# Patient Record
Sex: FEMALE | Race: WHITE | NOT HISPANIC OR LATINO | Employment: FULL TIME | URBAN - METROPOLITAN AREA
[De-identification: names, ages, dates, MRNs, and addresses within clinical notes are randomized per-mention and may not be internally consistent; named-entity substitution may affect disease eponyms.]

---

## 2017-02-14 PROBLEM — E66.813 OBESITY, CLASS III, BMI 40-49.9 (MORBID OBESITY): Status: ACTIVE | Noted: 2017-02-14

## 2017-02-14 PROBLEM — E66.01 OBESITY, CLASS III, BMI 40-49.9 (MORBID OBESITY): Status: ACTIVE | Noted: 2017-02-14

## 2017-02-14 PROBLEM — F32.9 MAJOR DEPRESSIVE DISORDER WITH CURRENT ACTIVE EPISODE: Status: ACTIVE | Noted: 2017-02-14

## 2017-04-12 PROBLEM — F32.5 MAJOR DEPRESSIVE DISORDER IN REMISSION: Status: ACTIVE | Noted: 2017-02-14

## 2017-04-12 PROBLEM — K76.0 FATTY LIVER: Status: ACTIVE | Noted: 2017-04-12

## 2017-04-12 PROBLEM — K22.4 ESOPHAGEAL SPASM: Status: ACTIVE | Noted: 2017-04-12

## 2017-04-12 PROBLEM — I10 ESSENTIAL HYPERTENSION: Status: ACTIVE | Noted: 2017-04-12

## 2017-04-12 PROBLEM — K82.4 GALLBLADDER POLYP: Status: ACTIVE | Noted: 2017-04-12

## 2017-04-12 PROBLEM — K21.9 GERD (GASTROESOPHAGEAL REFLUX DISEASE): Status: ACTIVE | Noted: 2017-04-12

## 2019-10-04 PROBLEM — Z12.11 COLON CANCER SCREENING: Status: ACTIVE | Noted: 2019-10-04

## 2021-03-18 ENCOUNTER — IMMUNIZATION (OUTPATIENT)
Dept: FAMILY MEDICINE | Facility: CLINIC | Age: 51
End: 2021-03-18

## 2021-03-18 DIAGNOSIS — Z23 NEED FOR VACCINATION: Primary | ICD-10-CM

## 2021-03-18 PROCEDURE — 0001A COVID-19, MRNA, LNP-S, PF, 30 MCG/0.3 ML DOSE VACCINE: ICD-10-PCS | Mod: CV19,,, | Performed by: FAMILY MEDICINE

## 2021-03-18 PROCEDURE — 91300 COVID-19, MRNA, LNP-S, PF, 30 MCG/0.3 ML DOSE VACCINE: CPT | Mod: ,,, | Performed by: FAMILY MEDICINE

## 2021-03-18 PROCEDURE — 91300 COVID-19, MRNA, LNP-S, PF, 30 MCG/0.3 ML DOSE VACCINE: ICD-10-PCS | Mod: ,,, | Performed by: FAMILY MEDICINE

## 2021-03-18 PROCEDURE — 0001A COVID-19, MRNA, LNP-S, PF, 30 MCG/0.3 ML DOSE VACCINE: CPT | Mod: CV19,,, | Performed by: FAMILY MEDICINE

## 2021-04-08 ENCOUNTER — IMMUNIZATION (OUTPATIENT)
Dept: FAMILY MEDICINE | Facility: CLINIC | Age: 51
End: 2021-04-08

## 2021-04-08 DIAGNOSIS — Z23 NEED FOR VACCINATION: Primary | ICD-10-CM

## 2021-04-08 PROCEDURE — 0002A COVID-19, MRNA, LNP-S, PF, 30 MCG/0.3 ML DOSE VACCINE: CPT | Mod: CV19,,, | Performed by: FAMILY MEDICINE

## 2021-04-08 PROCEDURE — 91300 COVID-19, MRNA, LNP-S, PF, 30 MCG/0.3 ML DOSE VACCINE: CPT | Mod: ,,, | Performed by: FAMILY MEDICINE

## 2021-04-08 PROCEDURE — 91300 COVID-19, MRNA, LNP-S, PF, 30 MCG/0.3 ML DOSE VACCINE: ICD-10-PCS | Mod: ,,, | Performed by: FAMILY MEDICINE

## 2021-04-08 PROCEDURE — 0002A COVID-19, MRNA, LNP-S, PF, 30 MCG/0.3 ML DOSE VACCINE: ICD-10-PCS | Mod: CV19,,, | Performed by: FAMILY MEDICINE

## 2022-05-23 ENCOUNTER — TELEPHONE (OUTPATIENT)
Dept: FAMILY MEDICINE | Facility: CLINIC | Age: 52
End: 2022-05-23
Payer: OTHER GOVERNMENT

## 2022-05-25 ENCOUNTER — TELEPHONE (OUTPATIENT)
Dept: FAMILY MEDICINE | Facility: CLINIC | Age: 52
End: 2022-05-25
Payer: OTHER GOVERNMENT

## 2022-06-03 ENCOUNTER — PATIENT MESSAGE (OUTPATIENT)
Dept: FAMILY MEDICINE | Facility: CLINIC | Age: 52
End: 2022-06-03
Payer: OTHER GOVERNMENT

## 2022-06-16 ENCOUNTER — TELEPHONE (OUTPATIENT)
Dept: FAMILY MEDICINE | Facility: CLINIC | Age: 52
End: 2022-06-16
Payer: OTHER GOVERNMENT

## 2022-07-11 ENCOUNTER — TELEPHONE (OUTPATIENT)
Dept: FAMILY MEDICINE | Facility: CLINIC | Age: 52
End: 2022-07-11
Payer: OTHER GOVERNMENT

## 2022-07-28 ENCOUNTER — OFFICE VISIT (OUTPATIENT)
Dept: OBSTETRICS AND GYNECOLOGY | Facility: CLINIC | Age: 52
End: 2022-07-28
Payer: OTHER GOVERNMENT

## 2022-07-28 VITALS
BODY MASS INDEX: 42.73 KG/M2 | WEIGHT: 272.25 LBS | DIASTOLIC BLOOD PRESSURE: 82 MMHG | SYSTOLIC BLOOD PRESSURE: 126 MMHG | HEIGHT: 67 IN

## 2022-07-28 DIAGNOSIS — Z01.419 WELL WOMAN EXAM: ICD-10-CM

## 2022-07-28 DIAGNOSIS — Z12.31 ENCOUNTER FOR SCREENING MAMMOGRAM FOR MALIGNANT NEOPLASM OF BREAST: Primary | ICD-10-CM

## 2022-07-28 PROCEDURE — 99386 PR PREVENTIVE VISIT,NEW,40-64: ICD-10-PCS | Mod: S$PBB,,, | Performed by: SPECIALIST

## 2022-07-28 PROCEDURE — 99386 PREV VISIT NEW AGE 40-64: CPT | Mod: S$PBB,,, | Performed by: SPECIALIST

## 2022-07-28 PROCEDURE — 99999 PR PBB SHADOW E&M-EST. PATIENT-LVL IV: CPT | Mod: PBBFAC,,, | Performed by: SPECIALIST

## 2022-07-28 PROCEDURE — 99214 OFFICE O/P EST MOD 30 MIN: CPT | Mod: PBBFAC,PN | Performed by: SPECIALIST

## 2022-07-28 PROCEDURE — 99999 PR PBB SHADOW E&M-EST. PATIENT-LVL IV: ICD-10-PCS | Mod: PBBFAC,,, | Performed by: SPECIALIST

## 2022-07-28 NOTE — PROGRESS NOTES
53 yo WF presents for annual gyn eval  Pos family history breast Ca  mammo screening up to date    Past Medical History:   Diagnosis Date    Allergy     Arthritis     Breast disorder     Deep vein thrombosis     DVT in left leg after hysterectomy    DVT (deep venous thrombosis) 2015    left leg    Migrane        Past Surgical History:   Procedure Laterality Date    ADENOIDECTOMY      COLONOSCOPY N/A 10/4/2019    Procedure: COLONOSCOPY;  Surgeon: Cain Johnson MD;  Location: [facility];  Service: Endoscopy;  Laterality: N/A;    DIAGNOSTIC LAPAROSCOPY      ovarian cyst removal    HYSTERECTOMY         Family History   Problem Relation Age of Onset    Breast cancer Mother     Hypertension Father     Heart disease Paternal Grandfather     Diabetes Paternal Grandfather     Cancer Paternal Grandmother     Cancer Maternal Grandmother     Colon cancer Neg Hx     Eclampsia Neg Hx     Miscarriages / Stillbirths Neg Hx     Ovarian cancer Neg Hx      labor Neg Hx     Stroke Neg Hx        Social History     Socioeconomic History    Marital status:    Tobacco Use    Smoking status: Never Smoker    Smokeless tobacco: Never Used   Substance and Sexual Activity    Alcohol use: Yes     Comment: Occasional    Drug use: No    Sexual activity: Yes     Partners: Male     Birth control/protection: See Surgical Hx       Current Outpatient Medications   Medication Sig Dispense Refill    butalbital-acetaminophen-caff -40 mg Cap TAKE 1 CAPSULE BY MOUTH EVERY 8 HOURS AS NEEDED 30 capsule 0    cetirizine (ZYRTEC) 10 MG tablet Take 10 mg by mouth daily as needed.       multivit with min-folic acid 200 mcg Chew Take 2 tablets by mouth once daily. One A Day Women's chewable      LORazepam (ATIVAN) 0.5 MG tablet 1 po 30 mins prior to mri (Patient not taking: Reported on 2022) 1 tablet 0    naproxen sodium (ALEVE) 220 MG tablet Take 1 tablet (220 mg total) by mouth 2 (two) times daily  with meals. (Patient not taking: Reported on 7/28/2022)       No current facility-administered medications for this visit.       Review of patient's allergies indicates:  No Known Allergies    Review of System:   General: no chills, fever, night sweats, weight gain or weight loss  Psychological: no depression or suicidal ideation  Breasts: no new or changing breast lumps, nipple discharge or masses.  Respiratory: no cough, shortness of breath, or wheezing  Cardiovascular: no chest pain or dyspnea on exertion  Gastrointestinal: no abdominal pain, change in bowel habits, or black or bloody stools  Genito-Urinary: no incontinence, urinary frequency/urgency or vulvar/vaginal symptoms, pelvic pain or abnormal vaginal bleeding.  Musculoskeletal: no gait disturbance or muscular weakness    PE:   APPEARANCE: Well nourished, well developed, in no acute distress.  NECK: Neck symmetric without masses or thyromegaly.  NODES: No inguinal lymph node enlargement.  ABDOMEN: Soft. No tenderness or masses. No hepatosplenomegaly. No hernias.  BREASTS: Symmetrical, no skin changes or visible lesions. No palpable masses, nipple discharge or adenopathy bilaterally.  PELVIC: Normal external female genitalia without lesions. Normal hair distribution. Adequate perineal body, normal urethral meatus. Vagina moist and well rugated without lesions or discharge. No significant cystocele or rectocele. Uterus and cervix surgically absent. Bimanual exam revealed no masses, tenderness or abnormality.    NOTE  NURSING PERSONAL PRESENT FOR ENTIRE PHYSICAL EXAM      Plan  BSE monthly  Screening mammogram q year  RTO 2 years/prn

## 2022-10-06 ENCOUNTER — HOSPITAL ENCOUNTER (OUTPATIENT)
Dept: RADIOLOGY | Facility: HOSPITAL | Age: 52
Discharge: HOME | End: 2022-10-06
Attending: SPECIALIST
Payer: OTHER GOVERNMENT

## 2022-10-06 DIAGNOSIS — Z12.31 ENCOUNTER FOR SCREENING MAMMOGRAM FOR MALIGNANT NEOPLASM OF BREAST: ICD-10-CM

## 2022-10-06 PROCEDURE — 77067 MAMMO DIGITAL SCREENING BILAT WITH TOMO: ICD-10-PCS | Mod: 26,,, | Performed by: RADIOLOGY

## 2022-10-06 PROCEDURE — 77063 BREAST TOMOSYNTHESIS BI: CPT | Mod: TC,PN

## 2022-10-06 PROCEDURE — 77063 BREAST TOMOSYNTHESIS BI: CPT | Mod: 26,,, | Performed by: RADIOLOGY

## 2022-10-06 PROCEDURE — 77067 SCR MAMMO BI INCL CAD: CPT | Mod: 26,,, | Performed by: RADIOLOGY

## 2022-10-06 PROCEDURE — 77063 MAMMO DIGITAL SCREENING BILAT WITH TOMO: ICD-10-PCS | Mod: 26,,, | Performed by: RADIOLOGY

## 2022-11-15 ENCOUNTER — TELEPHONE (OUTPATIENT)
Dept: NEUROLOGY | Facility: CLINIC | Age: 52
End: 2022-11-15
Payer: OTHER GOVERNMENT

## 2022-11-15 NOTE — TELEPHONE ENCOUNTER
Called patient and scheduled new patient appointment. Date/Time/Location confirmed. Verbalized understanding.

## 2022-12-06 ENCOUNTER — OFFICE VISIT (OUTPATIENT)
Dept: NEUROLOGY | Facility: CLINIC | Age: 52
End: 2022-12-06
Payer: OTHER GOVERNMENT

## 2022-12-06 VITALS
DIASTOLIC BLOOD PRESSURE: 99 MMHG | SYSTOLIC BLOOD PRESSURE: 160 MMHG | WEIGHT: 277.44 LBS | RESPIRATION RATE: 17 BRPM | BODY MASS INDEX: 43.54 KG/M2 | HEIGHT: 67 IN | HEART RATE: 70 BPM

## 2022-12-06 DIAGNOSIS — G43.119 INTRACTABLE MIGRAINE WITH AURA WITHOUT STATUS MIGRAINOSUS: Primary | ICD-10-CM

## 2022-12-06 DIAGNOSIS — G89.29 POST-COVID CHRONIC HEADACHE: ICD-10-CM

## 2022-12-06 DIAGNOSIS — R51.9 POST-COVID CHRONIC HEADACHE: ICD-10-CM

## 2022-12-06 DIAGNOSIS — R51.9 HEADACHE DISORDER: ICD-10-CM

## 2022-12-06 DIAGNOSIS — U09.9 POST-COVID CHRONIC HEADACHE: ICD-10-CM

## 2022-12-06 PROCEDURE — 99204 PR OFFICE/OUTPT VISIT, NEW, LEVL IV, 45-59 MIN: ICD-10-PCS | Mod: S$PBB,,, | Performed by: NURSE PRACTITIONER

## 2022-12-06 PROCEDURE — 99999 PR PBB SHADOW E&M-EST. PATIENT-LVL IV: CPT | Mod: PBBFAC,,, | Performed by: NURSE PRACTITIONER

## 2022-12-06 PROCEDURE — 99214 OFFICE O/P EST MOD 30 MIN: CPT | Mod: PBBFAC,PO | Performed by: NURSE PRACTITIONER

## 2022-12-06 PROCEDURE — 99204 OFFICE O/P NEW MOD 45 MIN: CPT | Mod: S$PBB,,, | Performed by: NURSE PRACTITIONER

## 2022-12-06 PROCEDURE — 99999 PR PBB SHADOW E&M-EST. PATIENT-LVL IV: ICD-10-PCS | Mod: PBBFAC,,, | Performed by: NURSE PRACTITIONER

## 2022-12-06 RX ORDER — FREMANEZUMAB-VFRM 225 MG/1.5ML
225 INJECTION SUBCUTANEOUS
Qty: 1 EACH | Refills: 11 | Status: SHIPPED | OUTPATIENT
Start: 2022-12-06 | End: 2023-01-05 | Stop reason: SDUPTHER

## 2022-12-06 RX ORDER — RIZATRIPTAN BENZOATE 10 MG/1
TABLET ORAL
Qty: 8 TABLET | Refills: 11 | Status: SHIPPED | OUTPATIENT
Start: 2022-12-06 | End: 2023-01-09 | Stop reason: SDUPTHER

## 2022-12-06 NOTE — PROGRESS NOTES
Date of service: 12/6/2022  Referring provider: Tracy Villalpando    Subjective:      Chief complaint: Headache       Patient ID: Mely Frias is a 52 y.o. female with anxiety, edema, fatty liver, GERD, h/o DVT, HLD, depression, migraine, obesity who presents for new patient evaluation of headache     History of Present Illness    ORIGINAL HEADACHE HISTORY - 12/6/22  Age at onset and course over time: around puberty began with menstrual migraines. Now the weather is a major trigger.     She did get Covid earlier this year. She had an aura sensation, dizziness. She had a headache but otherwise was fine. She is having more migraines since then and now having wake up and middle of the night migraines. MRI brain without contrast done in July 2022 which showed bilateral mastoid effusions. She saw ENT at that time.     She did sleep study last night.    Aura - occurs with the severe migraines. Colors in front of face.     Last eye exam - 2 years ago    Location:  right side, left side, holocephalic   Quality:  [x] pressure [] tight [x] throbbing [] sharp [x] stabbing   Severity: current 0 with range 0-9  Duration: hours, days  Frequency: 2 days per week  Headaches awaken at night?:  yes  Worst time of day: upon waking, mid-day  Associated with: [x] photophobia []  phonophobia [x] osmophobia [] blurred vision  [] double vision [x] loss of appetite [x] nausea [x] vomiting [] dizziness [x] vertigo  [] tinnitus [] irritability [x] sinus pressure [] problems with concentration   [x] neck tightness   Alleviated by:  [x] sleep [x] darkness [x] massage [] heat [x] ice [x] medication  Exacerbated by:  [x] fatigue [x] light [] noise [x] smells [] coughing [] sneezing  [x] bending over [x] Ovulation (historically) [] menses [] alcohol [x] change in weather [x]  stress  Ipsilateral autonomic: [] nasal congestion [] lacrimation [] ptosis [] injection [] edema [] foreign body sensation [] ear fullness   ICP:  [] transient visual  obscurations  [] tinnitus   [] positional headache  [x] non-positional   Sleep habits: trouble staying asleep, snoring   Caffeine intake: diet sprite  Gyn status (if female): hysterectomy   HIT 6 - 61    Current acute treatment:  Fioricet  Ibuprofen    Current prevention:  None    Previously tried/failed acute treatment:  Tylenol  Ibuprofen  Naproxen    Previously tried/failed preventative treatment:  Verapamil  Lexapro  Diltiazem  Depakote  Phenobarbital     Review of patient's allergies indicates:  No Known Allergies  Current Outpatient Medications   Medication Sig Dispense Refill    butalbital-acetaminophen-caff -40 mg Cap TAKE 1 CAPSULE BY MOUTH EVERY 8 HOURS AS NEEDED 30 capsule 0    cetirizine (ZYRTEC) 10 MG tablet Take 10 mg by mouth daily as needed.       ibuprofen (ADVIL,MOTRIN) 200 MG tablet Take 200 mg by mouth every 6 (six) hours as needed for Pain.      liraglutide, weight loss, (SAXENDA) 3 mg/0.5 mL (18 mg/3 mL) PnIj Inject 0.6 mg into the skin once a week for 7 days, THEN 1.2 mg once a week for 7 days, THEN 1.8 mg once a week for 7 days, THEN 2.4 mg once a week for 7 days, THEN 3 mg once a week for 7 days. 4 each 3    multivit with min-folic acid 200 mcg Chew Take 2 tablets by mouth once daily. One A Day Women's chewable      fremanezumab-vfrm (AJOVY AUTOINJECTOR) 225 mg/1.5 mL autoinjector Inject 1.5 mLs (225 mg total) into the skin every 28 days. 1 each 11    rizatriptan (MAXALT) 10 MG tablet 1 tab PO PRN migraine. May repeat every 2 hours for max 3 tabs in 24 hours. Use no more than 10 days per month. 8 tablet 11     No current facility-administered medications for this visit.       Past Medical History  Past Medical History:   Diagnosis Date    Allergy     Arthritis     Breast disorder     Deep vein thrombosis     DVT in left leg after hysterectomy    DVT (deep venous thrombosis) 2015    left leg    Migrane        Past Surgical History  Past Surgical History:   Procedure Laterality Date     ADENOIDECTOMY      COLONOSCOPY N/A 10/4/2019    Procedure: COLONOSCOPY;  Surgeon: Cain Johnson MD;  Location: [facility];  Service: Endoscopy;  Laterality: N/A;    DIAGNOSTIC LAPAROSCOPY      ovarian cyst removal    HYSTERECTOMY         Family History  Family History   Problem Relation Age of Onset    Breast cancer Mother         50's    Lung cancer Mother     Pancreatic cancer Mother     Hypertension Father     Cancer Maternal Grandmother     Cancer Paternal Grandmother     Heart disease Paternal Grandfather     Diabetes Paternal Grandfather     Colon cancer Neg Hx     Eclampsia Neg Hx     Miscarriages / Stillbirths Neg Hx     Ovarian cancer Neg Hx      labor Neg Hx     Stroke Neg Hx        Social History  Social History     Socioeconomic History    Marital status:    Tobacco Use    Smoking status: Never    Smokeless tobacco: Never   Substance and Sexual Activity    Alcohol use: Yes     Comment: Occasional    Drug use: No    Sexual activity: Yes     Partners: Male     Birth control/protection: See Surgical Hx        Review of Systems  14-point review of systems as follows:   No check ambar indicates NEGATIVE response   Constitutional: [] weight loss, [] change to appetite   Eyes: [] change in vision, [] double vision   Ears, nose, mouth, throat: [] frequent nose bleeds, [] ringing in the ears   Respiratory: [] cough, [] wheezing   Cardiovascular: [] chest pain, [] palpitations   Gastrointestinal: [] jaundice, [] nausea/vomiting   Genitourinary: [] incontinence, [] burning with urination   Hematologic/lymphatic: [] easy bruising/bleeding, [] night sweats   Neurological: [] numbness, [] weakness   Endocrine: [] fatigue, [] heat/cold intolerance   Allergy/Immunologic: [] fevers, [] chills   Musculoskeletal: [] muscle pain, [] joint pain   Psychiatric: [] thoughts of harming self/others, [] depression   Integumentary: [] rashes, [] sores that do not heal     Objective:        Vitals:    22 143    BP: (!) 160/99   Pulse: 70   Resp: 17     Body mass index is 43.45 kg/m².    12/6/22  Constitutional: appears in no acute distress, well-developed, well-nourished     Eyes: normal conjunctiva, PERRLA    Ears, nose, mouth, throat: external appearance of ears and nose normal, hearing intact     Cardiovascular: regular rate and rhythm, no murmurs appreciated    Respiratory: unlabored respirations, breath sounds normal bilaterally    Gastrointestinal: no visible abdominal masses, no guarding, no visible hernia    Musculoskeletal: normal tone in all four extremities. No abnormal movements. No pronator drift. No orbit. Symmetric finger tapping. Normal station. Normal regular gait. Normal tandem gait.      Spine:   CERVICAL SPINE:  ROM: mildly restricted    MUSCLE SPASM: no   FACET LOADING: no   SPURLING: no  NANCY / EPRLA tender: no     Psychiatric: normal judgment and insight. Oriented to person, place, and time.     Neurologic:   Cortical functions: recent and remote memory intact, normal attention span and concentration, speech fluent, adequate fund of knowledge   Cranial nerves: visual fields full, PERRLA, EOMI, symmetric facial strength, hearing intact, palate elevates symmetrically, shoulder shrug 5/5, tongue protrudes midline   Reflexes: 2+ in the upper and lower extremities, no Francisco  Sensation: intact to temperature throughout   Coordination: normal finger to nose, heel to shin, tandem gait     Data Review:     I have personally reviewed the referring provider's notes, labs, & imaging made available to me today.      RADIOLOGY STUDIES:  I have personally reviewed the pertinent images performed.       Results for orders placed or performed in visit on 07/01/22   MRI Brain Without Contrast    Narrative    EXAMINATION:  MRI BRAIN WITHOUT CONTRAST    CLINICAL HISTORY:  Headache, new or worsening (Age >= 50y); new daily persistent headache (NDPH).  Migraines.    TECHNIQUE:  Multiplanar multisequence MR imaging of the  brain was performed without contrast.    COMPARISON:  None.    FINDINGS:  INTRACRANIAL: Normal parenchymal volume.  Few tiny scattered nonspecific supratentorial white matter T2 FLAIR hyperintense foci.  No parenchymal restricted diffusion.  No evidence of intracranial hemorrhage.  No extra-axial fluid collection or mass.  No intracranial mass effect.  No hydrocephalus.  Midline structures have a normal configuration.  Visualized pituitary gland and infundibulum are normal.  Visualized major intracranial vascular structures demonstrate normal flow voids and are normal in course and caliber.    SINUSES: Paranasal sinuses are clear.  Incidental pneumatization of the clinoid processes bilaterally.  Bilateral mastoid effusions.    ORBITS: Visualized orbits are normal.      Impression    1. No acute intracranial abnormality.  2. Few scattered nonspecific white matter changes which may represent early chronic microvascular ischemic change and/or sequela of migraines.  3. Bilateral mastoid effusions.      Electronically signed by: Ananth Salazar MD  Date:    07/01/2022  Time:    09:39       Lab Results   Component Value Date     11/15/2022    K 4.2 11/15/2022     11/15/2022    CO2 30 11/15/2022    BUN 18 11/15/2022    CREATININE 0.67 11/15/2022     11/15/2022    AST 46 (H) 11/15/2022    ALT 53 (H) 11/15/2022    ALBUMIN 4.3 11/15/2022    PROT 7.7 11/15/2022    BILITOT 0.7 11/15/2022    CHOL 266 (H) 11/15/2022    HDL 37 (L) 11/15/2022    LDLCALC 191.6 (H) 11/15/2022    TRIG 187 (H) 11/15/2022       Lab Results   Component Value Date    WBC 7.44 11/15/2022    HGB 13.9 11/15/2022    HCT 42.4 11/15/2022    MCV 85 11/15/2022     11/15/2022       Lab Results   Component Value Date    TSH 1.280 11/15/2022           Assessment & Plan:       Problem List Items Addressed This Visit          Neuro    Intractable migraine with aura without status migrainosus - Primary    Overview     Migraine headaches since  puberty that were worsened with covid infection. Headaches are typically unilateral, moderate to severe in intensity, worsen with activity, pounding in quality and associated with sensitivity to light and smell.   Gradual progression pattern, lack of red flag features on history, and normal neurological exam are reassuring for primary as opposed to secondary etiology of headaches thus imaging will not be pursued for this history and this exam at this time.    Will start prevention with CGRP.     For acute attacks trial triptan.     Add magnesium for aura prevention          Relevant Medications    fremanezumab-vfrm (AJOVY AUTOINJECTOR) 225 mg/1.5 mL autoinjector    rizatriptan (MAXALT) 10 MG tablet     Other Visit Diagnoses       Headache disorder        Post-COVID chronic headache        Had daily headache post-Covid for several months. This has improved. Imaging was normal                Please call our clinic at [phone] or send a message on the [employer] portal if there are any changes to the plan described below, for example,if you are not contacted for the requested tests, referral(s) within one week, if you are unable to receive the medications prescribed, or if you feel you need to change the treatment course for any reason.     TESTING:  -- none    REFERRALS:  -- none    PREVENTION (use daily regardless of headache):  -- START Ajovy once monthly  -- start magnesium in ONE of the following preparations -               1. Magnesium oxide 800mg daily (the most common over the counter kind, may causes loose stools)              2. Magnesium citrate 400-500mg daily (harder to find, but more neutral on the bowels)              3. Magnesium glycinate 400mg daily (hardest to find, look online, but most bowel-neutral, best absorbed)     AS-NEEDED TREATMENT (use total no more than 10 days per month unless otherwise stated):  -- START rizatriptan with next migraine attack. You can repeat two hours later if needed.  If you still have a migraine after that, you can RESCUE with butalbital.     Follow up in about 3 months (around 3/6/2023) for follow up with MONICA.    Iesha Fabian NP

## 2022-12-06 NOTE — PATIENT INSTRUCTIONS
Please call our clinic at [phone] or send a message on the [employer] portal if there are any changes to the plan described below, for example,if you are not contacted for the requested tests, referral(s) within one week, if you are unable to receive the medications prescribed, or if you feel you need to change the treatment course for any reason.     TESTING:  -- none    REFERRALS:  -- none    PREVENTION (use daily regardless of headache):  -- START Ajovy once monthly  -- start magnesium in ONE of the following preparations -               1. Magnesium oxide 800mg daily (the most common over the counter kind, may causes loose stools)              2. Magnesium citrate 400-500mg daily (harder to find, but more neutral on the bowels)              3. Magnesium glycinate 400mg daily (hardest to find, look online, but most bowel-neutral, best absorbed)     AS-NEEDED TREATMENT (use total no more than 10 days per month unless otherwise stated):  -- START rizatriptan with next migraine attack. You can repeat two hours later if needed. If you still have a migraine after that, you can RESCUE with butalbital.

## 2022-12-14 ENCOUNTER — PATIENT MESSAGE (OUTPATIENT)
Dept: NEUROLOGY | Facility: CLINIC | Age: 52
End: 2022-12-14
Payer: OTHER GOVERNMENT

## 2023-01-05 ENCOUNTER — PATIENT MESSAGE (OUTPATIENT)
Dept: NEUROLOGY | Facility: CLINIC | Age: 53
End: 2023-01-05
Payer: OTHER GOVERNMENT

## 2023-01-08 DIAGNOSIS — G43.119 INTRACTABLE MIGRAINE WITH AURA WITHOUT STATUS MIGRAINOSUS: ICD-10-CM

## 2023-01-09 RX ORDER — RIZATRIPTAN BENZOATE 10 MG/1
TABLET ORAL
Refills: 0 | OUTPATIENT
Start: 2023-01-09

## 2023-01-09 RX ORDER — FREMANEZUMAB-VFRM 225 MG/1.5ML
225 INJECTION SUBCUTANEOUS
Qty: 1 EACH | Refills: 11 | Status: SHIPPED | OUTPATIENT
Start: 2023-01-09 | End: 2023-07-20

## 2023-01-09 RX ORDER — RIZATRIPTAN BENZOATE 10 MG/1
TABLET ORAL
Qty: 8 TABLET | Refills: 11 | Status: SHIPPED | OUTPATIENT
Start: 2023-01-09 | End: 2023-09-12 | Stop reason: CLARIF

## 2023-01-09 RX ORDER — FREMANEZUMAB-VFRM 225 MG/1.5ML
INJECTION SUBCUTANEOUS
Refills: 0 | OUTPATIENT
Start: 2023-01-09

## 2023-03-06 ENCOUNTER — OFFICE VISIT (OUTPATIENT)
Dept: NEUROLOGY | Facility: CLINIC | Age: 53
End: 2023-03-06
Payer: OTHER GOVERNMENT

## 2023-03-06 VITALS
SYSTOLIC BLOOD PRESSURE: 134 MMHG | RESPIRATION RATE: 17 BRPM | HEIGHT: 67 IN | DIASTOLIC BLOOD PRESSURE: 83 MMHG | HEART RATE: 77 BPM | BODY MASS INDEX: 42.79 KG/M2 | WEIGHT: 272.63 LBS

## 2023-03-06 DIAGNOSIS — G43.119 INTRACTABLE MIGRAINE WITH AURA WITHOUT STATUS MIGRAINOSUS: ICD-10-CM

## 2023-03-06 PROCEDURE — 99999 PR PBB SHADOW E&M-EST. PATIENT-LVL III: ICD-10-PCS | Mod: PBBFAC,,, | Performed by: NURSE PRACTITIONER

## 2023-03-06 PROCEDURE — 99213 PR OFFICE/OUTPT VISIT, EST, LEVL III, 20-29 MIN: ICD-10-PCS | Mod: S$PBB,,, | Performed by: NURSE PRACTITIONER

## 2023-03-06 PROCEDURE — 99213 OFFICE O/P EST LOW 20 MIN: CPT | Mod: PBBFAC,PO | Performed by: NURSE PRACTITIONER

## 2023-03-06 PROCEDURE — 99213 OFFICE O/P EST LOW 20 MIN: CPT | Mod: S$PBB,,, | Performed by: NURSE PRACTITIONER

## 2023-03-06 PROCEDURE — 99999 PR PBB SHADOW E&M-EST. PATIENT-LVL III: CPT | Mod: PBBFAC,,, | Performed by: NURSE PRACTITIONER

## 2023-03-06 NOTE — PATIENT INSTRUCTIONS
Please call our clinic at [phone] or send a message on the [employer] portal if there are any changes to the plan described below, for example,if you are not contacted for the requested tests, referral(s) within one week, if you are unable to receive the medications prescribed, or if you feel you need to change the treatment course for any reason.     TESTING:  -- none    REFERRALS:  -- none    PREVENTION (use daily regardless of headache):  -- continue Ajovy once monthly  -- start magnesium in ONE of the following preparations -               1. Magnesium oxide 800mg daily (the most common over the counter kind, may causes loose stools)              2. Magnesium citrate 400-500mg daily (harder to find, but more neutral on the bowels)              3. Magnesium glycinate 400mg daily (hardest to find, look online, but most bowel-neutral, best absorbed)     AS-NEEDED TREATMENT (use total no more than 10 days per month unless otherwise stated):  -- continue rizatriptan with next migraine attack. You can repeat two hours later if needed. If you still have a migraine after that, you can RESCUE with butalbital. Let me know if rizatriptan not effective with next migraine. If not, we will switch to different triptan.

## 2023-03-06 NOTE — PROGRESS NOTES
Date of service: 3/6/2023  Referring provider: No ref. provider found    Subjective:      Chief complaint: Headache         Patient ID: Mely Frias is a 53 y.o. female with anxiety, edema, fatty liver, GERD, h/o DVT, HLD, depression, migraine, obesity who presents for follow up of headache     History of Present Illness    INTERVAL HISTORY 3/6/23    Last visit was three months ago and at that time we started Ajovy and rizatriptan.     Today she reports she is better. She is not waking up with headaches. They occur in the temples to back of head, behind eyes. Current pain 0 with range 0-10. She has one headache day per week. She takes Fioricet or rizatriptan as needed.  Otherwise information below is reviewed and verified with no changes made     ORIGINAL HEADACHE HISTORY - 12/6/22  Age at onset and course over time: around puberty began with menstrual migraines. Now the weather is a major trigger.     She did get Covid earlier this year. She had an aura sensation, dizziness. She had a headache but otherwise was fine. She is having more migraines since then and now having wake up and middle of the night migraines. MRI brain without contrast done in July 2022 which showed bilateral mastoid effusions. She saw ENT at that time.     She did sleep study last night.    Aura - occurs with the severe migraines. Colors in front of face.     Last eye exam - 2 years ago    Location:  right side, left side, holocephalic   Quality:  [x] pressure [] tight [x] throbbing [] sharp [x] stabbing   Severity: current 0 with range 0-9  Duration: hours, days  Frequency: 2 days per week  Headaches awaken at night?:  yes  Worst time of day: upon waking, mid-day  Associated with: [x] photophobia []  phonophobia [x] osmophobia [] blurred vision  [] double vision [x] loss of appetite [x] nausea [x] vomiting [] dizziness [x] vertigo  [] tinnitus [] irritability [x] sinus pressure [] problems with concentration   [x] neck tightness    Alleviated by:  [x] sleep [x] darkness [x] massage [] heat [x] ice [x] medication  Exacerbated by:  [x] fatigue [x] light [] noise [x] smells [] coughing [] sneezing  [x] bending over [x] Ovulation (historically) [] menses [] alcohol [x] change in weather [x]  stress  Ipsilateral autonomic: [] nasal congestion [] lacrimation [] ptosis [] injection [] edema [] foreign body sensation [] ear fullness   ICP:  [] transient visual obscurations  [] tinnitus   [] positional headache  [x] non-positional   Sleep habits: trouble staying asleep, snoring   Caffeine intake: diet sprite  Gyn status (if female): hysterectomy   HIT 6 - 61    Current acute treatment:  Fioricet  Ibuprofen  Rizatriptan - not effective     Current prevention:  Ajovy - first February 2023    Previously tried/failed acute treatment:  Tylenol  Ibuprofen  Naproxen    Previously tried/failed preventative treatment:  Verapamil  Lexapro  Diltiazem  Depakote  Phenobarbital     Review of patient's allergies indicates:  No Known Allergies  Current Outpatient Medications   Medication Sig Dispense Refill    butalbital-acetaminophen-caff -40 mg Cap Take 1 capsule by mouth every 8 (eight) hours as needed. 30 capsule 0    cetirizine (ZYRTEC) 10 MG tablet Take 10 mg by mouth daily as needed.       fremanezumab-vfrm (AJOVY AUTOINJECTOR) 225 mg/1.5 mL autoinjector Inject 1.5 mLs (225 mg total) into the skin every 28 days. 1 each 11    ibuprofen (ADVIL,MOTRIN) 200 MG tablet Take 200 mg by mouth every 6 (six) hours as needed for Pain.      multivit with min-folic acid 200 mcg Chew Take 2 tablets by mouth once daily. One A Day Women's chewable      rizatriptan (MAXALT) 10 MG tablet 1 tab PO PRN migraine. May repeat every 2 hours for max 3 tabs in 24 hours. Use no more than 10 days per month. 8 tablet 11     No current facility-administered medications for this visit.       Past Medical History  Past Medical History:   Diagnosis Date    Allergy     Arthritis      Breast disorder     Deep vein thrombosis     DVT in left leg after hysterectomy    DVT (deep venous thrombosis)     left leg    Migrane        Past Surgical History  Past Surgical History:   Procedure Laterality Date    ADENOIDECTOMY      COLONOSCOPY N/A 10/4/2019    Procedure: COLONOSCOPY;  Surgeon: Cain Johnson MD;  Location: [facility];  Service: Endoscopy;  Laterality: N/A;    DIAGNOSTIC LAPAROSCOPY      ovarian cyst removal    HYSTERECTOMY         Family History  Family History   Problem Relation Age of Onset    Breast cancer Mother         50's    Lung cancer Mother     Pancreatic cancer Mother     Hypertension Father     Cancer Maternal Grandmother     Cancer Paternal Grandmother     Heart disease Paternal Grandfather     Diabetes Paternal Grandfather     Colon cancer Neg Hx     Eclampsia Neg Hx     Miscarriages / Stillbirths Neg Hx     Ovarian cancer Neg Hx      labor Neg Hx     Stroke Neg Hx        Social History  Social History     Socioeconomic History    Marital status:    Tobacco Use    Smoking status: Never    Smokeless tobacco: Never   Substance and Sexual Activity    Alcohol use: Yes     Comment: Occasional    Drug use: No    Sexual activity: Yes     Partners: Male     Birth control/protection: See Surgical Hx        Review of Systems  14-point review of systems as follows:   No check ambar indicates NEGATIVE response   Constitutional: [] weight loss, [] change to appetite   Eyes: [] change in vision, [] double vision   Ears, nose, mouth, throat: [] frequent nose bleeds, [] ringing in the ears   Respiratory: [] cough, [] wheezing   Cardiovascular: [] chest pain, [] palpitations   Gastrointestinal: [] jaundice, [] nausea/vomiting   Genitourinary: [] incontinence, [] burning with urination   Hematologic/lymphatic: [] easy bruising/bleeding, [] night sweats   Neurological: [] numbness, [] weakness   Endocrine: [] fatigue, [] heat/cold intolerance   Allergy/Immunologic: [] fevers,  [] chills   Musculoskeletal: [] muscle pain, [] joint pain   Psychiatric: [] thoughts of harming self/others, [] depression   Integumentary: [] rashes, [] sores that do not heal     Objective:        Vitals:    03/06/23 1410   BP: 134/83   Pulse: 77   Resp: 17       Body mass index is 42.7 kg/m².    3/6/23  Constitutional:   She appears well-developed and well-nourished. She is well groomed     Neurological Exam:  General: well-developed, well-nourished, no distress  Mental status: Awake and alert  Speech language: No dysarthria or aphasia on conversation  Cranial nerves: Face symmetric  Motor: Moves all extremities well  Coordination: No ataxia. No tremor.     12/6/22  Constitutional: appears in no acute distress, well-developed, well-nourished     Eyes: normal conjunctiva, PERRLA    Ears, nose, mouth, throat: external appearance of ears and nose normal, hearing intact     Cardiovascular: regular rate and rhythm, no murmurs appreciated    Respiratory: unlabored respirations, breath sounds normal bilaterally    Gastrointestinal: no visible abdominal masses, no guarding, no visible hernia    Musculoskeletal: normal tone in all four extremities. No abnormal movements. No pronator drift. No orbit. Symmetric finger tapping. Normal station. Normal regular gait. Normal tandem gait.      Spine:   CERVICAL SPINE:  ROM: mildly restricted    MUSCLE SPASM: no   FACET LOADING: no   SPURLING: no  NANCY / PERLA tender: no     Psychiatric: normal judgment and insight. Oriented to person, place, and time.     Neurologic:   Cortical functions: recent and remote memory intact, normal attention span and concentration, speech fluent, adequate fund of knowledge   Cranial nerves: visual fields full, PERRLA, EOMI, symmetric facial strength, hearing intact, palate elevates symmetrically, shoulder shrug 5/5, tongue protrudes midline   Reflexes: 2+ in the upper and lower extremities, no Francisco  Sensation: intact to temperature throughout    Coordination: normal finger to nose, heel to shin, tandem gait     Data Review:     I have personally reviewed the referring provider's notes, labs, & imaging made available to me today.      RADIOLOGY STUDIES:  I have personally reviewed the pertinent images performed.       Results for orders placed or performed in visit on 07/01/22   MRI Brain Without Contrast    Narrative    EXAMINATION:  MRI BRAIN WITHOUT CONTRAST    CLINICAL HISTORY:  Headache, new or worsening (Age >= 50y); new daily persistent headache (NDPH).  Migraines.    TECHNIQUE:  Multiplanar multisequence MR imaging of the brain was performed without contrast.    COMPARISON:  None.    FINDINGS:  INTRACRANIAL: Normal parenchymal volume.  Few tiny scattered nonspecific supratentorial white matter T2 FLAIR hyperintense foci.  No parenchymal restricted diffusion.  No evidence of intracranial hemorrhage.  No extra-axial fluid collection or mass.  No intracranial mass effect.  No hydrocephalus.  Midline structures have a normal configuration.  Visualized pituitary gland and infundibulum are normal.  Visualized major intracranial vascular structures demonstrate normal flow voids and are normal in course and caliber.    SINUSES: Paranasal sinuses are clear.  Incidental pneumatization of the clinoid processes bilaterally.  Bilateral mastoid effusions.    ORBITS: Visualized orbits are normal.      Impression    1. No acute intracranial abnormality.  2. Few scattered nonspecific white matter changes which may represent early chronic microvascular ischemic change and/or sequela of migraines.  3. Bilateral mastoid effusions.      Electronically signed by: Ananth Salazar MD  Date:    07/01/2022  Time:    09:39       Lab Results   Component Value Date     11/15/2022    K 4.2 11/15/2022     11/15/2022    CO2 30 11/15/2022    BUN 18 11/15/2022    CREATININE 0.67 11/15/2022     11/15/2022    AST 46 (H) 11/15/2022    ALT 53 (H) 11/15/2022     ALBUMIN 4.3 11/15/2022    PROT 7.7 11/15/2022    BILITOT 0.7 11/15/2022    CHOL 266 (H) 11/15/2022    HDL 37 (L) 11/15/2022    LDLCALC 191.6 (H) 11/15/2022    TRIG 187 (H) 11/15/2022       Lab Results   Component Value Date    WBC 7.44 11/15/2022    HGB 13.9 11/15/2022    HCT 42.4 11/15/2022    MCV 85 11/15/2022     11/15/2022       Lab Results   Component Value Date    TSH 1.280 11/15/2022           Assessment & Plan:       Problem List Items Addressed This Visit          Neuro    Intractable migraine with aura without status migrainosus    Overview     Migraine headaches since puberty that were worsened with covid infection. Headaches are typically unilateral, moderate to severe in intensity, worsen with activity, pounding in quality and associated with sensitivity to light and smell.   Gradual progression pattern, lack of red flag features on history, and normal neurological exam are reassuring for primary as opposed to secondary etiology of headaches thus imaging will not be pursued for this history and this exam at this time.    Will start prevention with CGRP.     For acute attacks trial triptan.     Add magnesium for aura prevention          Current Assessment & Plan     She is s/p first dose of Ajovy with already having nearly 50% reduction in migraine frequency. Will continue current plan.                   Please call our clinic at [phone] or send a message on the [employer] portal if there are any changes to the plan described below, for example,if you are not contacted for the requested tests, referral(s) within one week, if you are unable to receive the medications prescribed, or if you feel you need to change the treatment course for any reason.     TESTING:  -- none    REFERRALS:  -- none    PREVENTION (use daily regardless of headache):  -- continue Ajovy once monthly  -- start magnesium in ONE of the following preparations -               1. Magnesium oxide 800mg daily (the most common over  the counter kind, may causes loose stools)              2. Magnesium citrate 400-500mg daily (harder to find, but more neutral on the bowels)              3. Magnesium glycinate 400mg daily (hardest to find, look online, but most bowel-neutral, best absorbed)     AS-NEEDED TREATMENT (use total no more than 10 days per month unless otherwise stated):  -- continue rizatriptan with next migraine attack. You can repeat two hours later if needed. If you still have a migraine after that, you can RESCUE with butalbital. Let me know if rizatriptan not effective with next migraine. If not, we will switch to different triptan.     Follow up in about 3 months (around 6/6/2023) for follow up with MONICA.    Iesha Fabian NP

## 2023-03-06 NOTE — ASSESSMENT & PLAN NOTE
She is s/p first dose of Ajovy with already having nearly 50% reduction in migraine frequency. Will continue current plan.

## 2023-07-19 DIAGNOSIS — G43.119 INTRACTABLE MIGRAINE WITH AURA WITHOUT STATUS MIGRAINOSUS: ICD-10-CM

## 2023-07-20 RX ORDER — FREMANEZUMAB-VFRM 225 MG/1.5ML
INJECTION SUBCUTANEOUS
Qty: 1.5 ML | Refills: 12 | Status: SHIPPED | OUTPATIENT
Start: 2023-07-20 | End: 2023-12-05 | Stop reason: SDUPTHER

## 2023-09-14 PROBLEM — N20.1 URETERAL STONE: Status: ACTIVE | Noted: 2023-09-14

## 2023-12-05 DIAGNOSIS — G43.119 INTRACTABLE MIGRAINE WITH AURA WITHOUT STATUS MIGRAINOSUS: ICD-10-CM

## 2023-12-06 RX ORDER — FREMANEZUMAB-VFRM 225 MG/1.5ML
225 INJECTION SUBCUTANEOUS
Qty: 1.5 ML | Refills: 12 | Status: SHIPPED | OUTPATIENT
Start: 2023-12-06 | End: 2024-01-25

## 2023-12-11 ENCOUNTER — TELEPHONE (OUTPATIENT)
Dept: NEUROLOGY | Facility: CLINIC | Age: 53
End: 2023-12-11
Payer: OTHER GOVERNMENT

## 2024-01-22 ENCOUNTER — PATIENT MESSAGE (OUTPATIENT)
Dept: NEUROLOGY | Facility: CLINIC | Age: 54
End: 2024-01-22
Payer: OTHER GOVERNMENT

## 2024-01-23 ENCOUNTER — TELEPHONE (OUTPATIENT)
Dept: NEUROLOGY | Facility: CLINIC | Age: 54
End: 2024-01-23
Payer: OTHER GOVERNMENT

## 2024-01-24 DIAGNOSIS — G43.119 INTRACTABLE MIGRAINE WITH AURA WITHOUT STATUS MIGRAINOSUS: ICD-10-CM

## 2024-01-25 RX ORDER — FREMANEZUMAB-VFRM 225 MG/1.5ML
INJECTION SUBCUTANEOUS
Qty: 1.5 ML | Refills: 2 | Status: SHIPPED | OUTPATIENT
Start: 2024-01-25 | End: 2024-02-14 | Stop reason: SDUPTHER

## 2024-02-13 DIAGNOSIS — G43.119 INTRACTABLE MIGRAINE WITH AURA WITHOUT STATUS MIGRAINOSUS: ICD-10-CM

## 2024-02-14 ENCOUNTER — PATIENT MESSAGE (OUTPATIENT)
Dept: NEUROLOGY | Facility: CLINIC | Age: 54
End: 2024-02-14
Payer: OTHER GOVERNMENT

## 2024-02-14 RX ORDER — FREMANEZUMAB-VFRM 225 MG/1.5ML
INJECTION SUBCUTANEOUS
Qty: 3 EACH | Refills: 0 | Status: SHIPPED | OUTPATIENT
Start: 2024-02-14 | End: 2024-03-21 | Stop reason: SDUPTHER

## 2024-02-14 RX ORDER — FREMANEZUMAB-VFRM 225 MG/1.5ML
INJECTION SUBCUTANEOUS
Refills: 0 | OUTPATIENT
Start: 2024-02-14

## 2024-03-21 ENCOUNTER — OFFICE VISIT (OUTPATIENT)
Dept: NEUROLOGY | Facility: CLINIC | Age: 54
End: 2024-03-21
Payer: OTHER GOVERNMENT

## 2024-03-21 VITALS
SYSTOLIC BLOOD PRESSURE: 128 MMHG | DIASTOLIC BLOOD PRESSURE: 84 MMHG | WEIGHT: 257.63 LBS | RESPIRATION RATE: 18 BRPM | BODY MASS INDEX: 40.44 KG/M2 | HEIGHT: 67 IN | HEART RATE: 75 BPM

## 2024-03-21 DIAGNOSIS — G43.119 INTRACTABLE MIGRAINE WITH AURA WITHOUT STATUS MIGRAINOSUS: Primary | ICD-10-CM

## 2024-03-21 DIAGNOSIS — G43.109 MIGRAINE WITH AURA AND WITHOUT STATUS MIGRAINOSUS, NOT INTRACTABLE: ICD-10-CM

## 2024-03-21 PROCEDURE — 99213 OFFICE O/P EST LOW 20 MIN: CPT | Mod: S$PBB,,, | Performed by: NURSE PRACTITIONER

## 2024-03-21 PROCEDURE — 99214 OFFICE O/P EST MOD 30 MIN: CPT | Mod: PBBFAC,PO | Performed by: NURSE PRACTITIONER

## 2024-03-21 PROCEDURE — 99999 PR PBB SHADOW E&M-EST. PATIENT-LVL IV: CPT | Mod: PBBFAC,,, | Performed by: NURSE PRACTITIONER

## 2024-03-21 RX ORDER — BUTALBITAL, ACETAMINOPHEN AND CAFFEINE 300; 40; 50 MG/1; MG/1; MG/1
1 CAPSULE ORAL EVERY 8 HOURS PRN
Qty: 30 CAPSULE | Refills: 0 | Status: SHIPPED | OUTPATIENT
Start: 2024-03-21

## 2024-03-21 RX ORDER — METFORMIN HYDROCHLORIDE 500 MG/1
500 TABLET ORAL
COMMUNITY
Start: 2023-11-02

## 2024-03-21 RX ORDER — RIMEGEPANT SULFATE 75 MG/75MG
75 TABLET, ORALLY DISINTEGRATING ORAL DAILY PRN
Qty: 8 TABLET | Refills: 11 | Status: SHIPPED | OUTPATIENT
Start: 2024-03-21

## 2024-03-21 RX ORDER — FREMANEZUMAB-VFRM 225 MG/1.5ML
INJECTION SUBCUTANEOUS
Qty: 3 EACH | Refills: 3 | Status: SHIPPED | OUTPATIENT
Start: 2024-03-21

## 2024-03-21 NOTE — PATIENT INSTRUCTIONS
Please call our clinic at [phone] or send a message on the [employer] portal if there are any changes to the plan described below, for example,if you are not contacted for the requested tests, referral(s) within one week, if you are unable to receive the medications prescribed, or if you feel you need to change the treatment course for any reason.     TESTING:  -- none    REFERRALS:  -- none    PREVENTION (use daily regardless of headache):  -- continue Ajovy once monthly  -- start magnesium in ONE of the following preparations -               1. Magnesium oxide 800mg daily (the most common over the counter kind, may causes loose stools)              2. Magnesium citrate 400-500mg daily (harder to find, but more neutral on the bowels)              3. Magnesium glycinate 400mg daily (hardest to find, look online, but most bowel-neutral, best absorbed)     AS-NEEDED TREATMENT (use total no more than 10 days per month unless otherwise stated):  -- continue butalbital as needed  -- TRIAL Nurtec once every other day the week Ajovy is due.

## 2024-03-21 NOTE — PROGRESS NOTES
Date of service: 3/21/2024  Referring provider: No ref. provider found    Subjective:      Chief complaint: Migraine         Patient ID: Mely Frias is a 54 y.o. female with anxiety, edema, fatty liver, GERD, h/o DVT, HLD, depression, migraine, obesity who presents for follow up of headache     History of Present Illness    INTERVAL HISTORY 3/21/24    Last visit was one year ago and at that time she was doing better.    Today she reports she is better. Current pain 0 with range 0-9. She reports may once headache per week. She takes butalbital about once per week. Otherwise information below is reviewed and verified with no changes made     INTERVAL HISTORY 3/6/23    Last visit was three months ago and at that time we started Ajovy and rizatriptan.     Today she reports she is better. She is not waking up with headaches. They occur in the temples to back of head, behind eyes. Current pain 0 with range 0-10. She has one headache day per week. She takes Fioricet or rizatriptan as needed. Last Fioricet filled December 2023 per . Otherwise information below is reviewed and verified with no changes made     ORIGINAL HEADACHE HISTORY - 12/6/22  Age at onset and course over time: around puberty began with menstrual migraines. Now the weather is a major trigger.     She did get Covid earlier this year. She had an aura sensation, dizziness. She had a headache but otherwise was fine. She is having more migraines since then and now having wake up and middle of the night migraines. MRI brain without contrast done in July 2022 which showed bilateral mastoid effusions. She saw ENT at that time.     She did sleep study last night.    Aura - occurs with the severe migraines. Colors in front of face.     Last eye exam - 2 years ago    Location:  right side, left side, holocephalic   Quality:  [x] pressure [] tight [x] throbbing [] sharp [x] stabbing   Severity: current 0 with range 0-9  Duration: hours, days  Frequency: 2 days  per week  Headaches awaken at night?:  yes  Worst time of day: upon waking, mid-day  Associated with: [x] photophobia []  phonophobia [x] osmophobia [] blurred vision  [] double vision [x] loss of appetite [x] nausea [x] vomiting [] dizziness [x] vertigo  [] tinnitus [] irritability [x] sinus pressure [] problems with concentration   [x] neck tightness   Alleviated by:  [x] sleep [x] darkness [x] massage [] heat [x] ice [x] medication  Exacerbated by:  [x] fatigue [x] light [] noise [x] smells [] coughing [] sneezing  [x] bending over [x] Ovulation (historically) [] menses [] alcohol [x] change in weather [x]  stress  Ipsilateral autonomic: [] nasal congestion [] lacrimation [] ptosis [] injection [] edema [] foreign body sensation [] ear fullness   ICP:  [] transient visual obscurations  [] tinnitus   [] positional headache  [x] non-positional   Sleep habits: trouble staying asleep, snoring   Caffeine intake: diet sprite  Gyn status (if female): hysterectomy   HIT 6 - 61    Current acute treatment:  Fioricet  Ibuprofen     Current prevention:  Ajovy - first February 2023    Previously tried/failed acute treatment:  Tylenol  Ibuprofen  Naproxen  Rizatriptan - not effective  Sumatriptan    Previously tried/failed preventative treatment:  Verapamil  Lexapro  Diltiazem  Depakote  Phenobarbital     Review of patient's allergies indicates:  No Known Allergies  Current Outpatient Medications   Medication Sig Dispense Refill    cetirizine (ZYRTEC) 10 MG tablet Take 10 mg by mouth daily as needed.       ibuprofen (ADVIL,MOTRIN) 200 MG tablet Take 200 mg by mouth every 6 (six) hours as needed for Pain.      metFORMIN (GLUCOPHAGE) 500 MG tablet Take 500 mg by mouth daily with breakfast.      multivit with min-folic acid 200 mcg Chew Take 2 tablets by mouth once daily. One A Day Women's chewable      semaglutide (OZEMPIC SUBQ) Inject 70 Units into the skin once a week.      butalbital-acetaminophen-caff -40 mg Cap Take 1  capsule by mouth every 8 (eight) hours as needed (migraine). 30 capsule 0    fremanezumab-vfrm (AJOVY AUTOINJECTOR) 225 mg/1.5 mL autoinjector INJECT 1.5 ML(225 MG TOTAL) UNDER THE SKIN EVERY 28 DAYS 3 each 3    HYDROcodone-acetaminophen (NORCO) 5-325 mg per tablet Take 1 tablet by mouth every 6 (six) hours as needed for Pain. (Patient not taking: Reported on 3/21/2024) 11 tablet 0    ketorolac (TORADOL) 10 mg tablet Take 1 tablet (10 mg total) by mouth every 6 (six) hours as needed for Pain. (Patient not taking: Reported on 3/21/2024) 28 tablet 0    phenazopyridine (PYRIDIUM) 200 MG tablet Take 1 tablet (200 mg total) by mouth 3 (three) times daily as needed for Pain. (Patient not taking: Reported on 3/21/2024) 20 tablet 1    rimegepant (NURTEC) 75 mg odt Take 1 tablet (75 mg total) by mouth daily as needed for Migraine. Place ODT tablet on the tongue; alternatively the ODT tablet may be placed under the tongue 8 tablet 11    sulfamethoxazole-trimethoprim 800-160mg (BACTRIM DS) 800-160 mg Tab Take 1 tablet by mouth 2 (two) times daily.       No current facility-administered medications for this visit.     Facility-Administered Medications Ordered in Other Visits   Medication Dose Route Frequency Provider Last Rate Last Admin    dextrose 50% injection 12.5 g  12.5 g Intravenous PRN Karan Sorto MD        dextrose 50% injection 25 g  25 g Intravenous PRN Karan Sorto MD        HYDROmorphone injection 0.5 mg  0.5 mg Intravenous Q5 Min PRN Pollo Gabriel MD   0.5 mg at 09/14/23 0755    insulin regular injection 0-8 Units  0-8 Units Intravenous PRN Karan Sorto MD        lactated ringers infusion   Intravenous Continuous Karan Sorto MD 20 mL/hr at 09/14/23 0614 Restarted at 09/14/23 0647    ondansetron injection 4 mg  4 mg Intravenous Daily PRN Pollo Gabriel MD        prochlorperazine injection Soln 10 mg  10 mg Intravenous Q30 Min PRN Pollo Gabriel MD            Past Medical History  Past Medical History:   Diagnosis Date    Allergy     Arthritis     Breast disorder     Deep vein thrombosis     DVT in left leg after hysterectomy    DVT (deep venous thrombosis)     left leg    Migrane     Renal disorder     URETERAL STONE       Past Surgical History  Past Surgical History:   Procedure Laterality Date    ADENOIDECTOMY      COLONOSCOPY N/A 10/04/2019    Procedure: COLONOSCOPY;  Surgeon: Cain Johnson MD;  Location: [facility];  Service: Endoscopy;  Laterality: N/A;    CYSTOSCOPY WITH CALCULUS EXTRACTION Left 2023    Procedure: CYSTOSCOPY, WITH CALCULUS REMOVAL;  Surgeon: Roc Godinez MD;  Location: [facility] OR;  Service: Urology;  Laterality: Left;    CYSTOURETEROSCOPY WITH RETROGRADE PYELOGRAPHY AND INSERTION OF STENT INTO URETER Left 2023    Procedure: CYSTOURETEROSCOPY,;  Surgeon: Roc Godinez MD;  Location: [facility] OR;  Service: Urology;  Laterality: Left;    DIAGNOSTIC LAPAROSCOPY      ovarian cyst removal    HYSTERECTOMY      complete    LASER LITHOTRIPSY Left 2023    Procedure: LITHOTRIPSY, USING LASER - Thulium;  Surgeon: Roc Godinez MD;  Location: [facility] OR;  Service: Urology;  Laterality: Left;       Family History  Family History   Problem Relation Age of Onset    Breast cancer Mother         50's    Lung cancer Mother     Pancreatic cancer Mother     Hypertension Father     Ovarian cancer Maternal Grandmother     Cancer Maternal Grandmother     Cancer Paternal Grandmother     Heart disease Paternal Grandfather     Diabetes Paternal Grandfather     Colon cancer Neg Hx     Eclampsia Neg Hx     Miscarriages / Stillbirths Neg Hx      labor Neg Hx     Stroke Neg Hx        Social History  Social History     Socioeconomic History    Marital status:    Tobacco Use    Smoking status: Never    Smokeless tobacco: Never   Substance and Sexual Activity    Alcohol use: Yes     Comment: Occasional    Drug use: No    Sexual activity:  Yes     Partners: Male     Birth control/protection: See Surgical Hx          Objective:        Vitals:    03/21/24 1431   BP: 128/84   Pulse: 75   Resp: 18         Body mass index is 40.35 kg/m².    3/21/24  Constitutional:   She appears well-developed and well-nourished. She is well groomed     Neurological Exam:  General: well-developed, well-nourished, no distress  Mental status: Awake and alert  Speech language: No dysarthria or aphasia on conversation  Cranial nerves: Face symmetric  Motor: Moves all extremities well  Coordination: No ataxia. No tremor.       Data Review:     I have personally reviewed the referring provider's notes, labs, & imaging made available to me today.      RADIOLOGY STUDIES:  I have personally reviewed the pertinent images performed.       Results for orders placed or performed in visit on 07/01/22   MRI Brain Without Contrast    Narrative    EXAMINATION:  MRI BRAIN WITHOUT CONTRAST    CLINICAL HISTORY:  Headache, new or worsening (Age >= 50y); new daily persistent headache (ND[facility]).  Migraines.    TECHNIQUE:  Multiplanar multisequence MR imaging of the brain was performed without contrast.    COMPARISON:  None.    FINDINGS:  INTRACRANIAL: Normal parenchymal volume.  Few tiny scattered nonspecific supratentorial white matter T2 FLAIR hyperintense foci.  No parenchymal restricted diffusion.  No evidence of intracranial hemorrhage.  No extra-axial fluid collection or mass.  No intracranial mass effect.  No hydrocephalus.  Midline structures have a normal configuration.  Visualized pituitary gland and infundibulum are normal.  Visualized major intracranial vascular structures demonstrate normal flow voids and are normal in course and caliber.    SINUSES: Paranasal sinuses are clear.  Incidental pneumatization of the clinoid processes bilaterally.  Bilateral mastoid effusions.    ORBITS: Visualized orbits are normal.      Impression    1. No acute intracranial abnormality.  2. Few scattered  nonspecific white matter changes which may represent early chronic microvascular ischemic change and/or sequela of migraines.  3. Bilateral mastoid effusions.      Electronically signed by: Ananth Salazar MD  Date:    07/01/2022  Time:    09:39       Lab Results   Component Value Date     08/21/2023    K 4.2 08/21/2023     08/21/2023    CO2 26 08/21/2023    BUN 17 08/21/2023    CREATININE 0.90 08/21/2023    GLU 94 08/21/2023    HGBA1C 5.0 09/14/2023    AST 46 (H) 11/15/2022    ALT 53 (H) 11/15/2022    ALBUMIN 4.3 11/15/2022    PROT 7.7 11/15/2022    BILITOT 0.7 11/15/2022    CHOL 266 (H) 11/15/2022    HDL 37 (L) 11/15/2022    LDLCALC 191.6 (H) 11/15/2022    TRIG 187 (H) 11/15/2022       Lab Results   Component Value Date    WBC 7.02 08/21/2023    HGB 13.4 08/21/2023    HCT 37.1 08/21/2023    MCV 83 08/21/2023     08/21/2023       Lab Results   Component Value Date    TSH 1.280 11/15/2022           Assessment & Plan:       Problem List Items Addressed This Visit          Neuro    Intractable migraine with aura without status migrainosus - Primary    Overview     Migraine headaches since puberty that were worsened with covid infection. Headaches are typically unilateral, moderate to severe in intensity, worsen with activity, pounding in quality and associated with sensitivity to light and smell.   Gradual progression pattern, lack of red flag features on history, and normal neurological exam are reassuring for primary as opposed to secondary etiology of headaches thus imaging will not be pursued for this history and this exam at this time.    She has done very well on Ajovy. Continue.     For acute attacks trial Nurtec.     Add magnesium for aura prevention          Relevant Medications    rimegepant (NURTEC) 75 mg odt    fremanezumab-vfrm (AJOVY AUTOINJECTOR) 225 mg/1.5 mL autoinjector    butalbital-acetaminophen-caff -40 mg Cap     Other Visit Diagnoses       Migraine with aura and without  status migrainosus, not intractable        Relevant Medications    butalbital-acetaminophen-caff -40 mg Cap                    Please call our clinic at [phone] or send a message on the [employer] portal if there are any changes to the plan described below, for example,if you are not contacted for the requested tests, referral(s) within one week, if you are unable to receive the medications prescribed, or if you feel you need to change the treatment course for any reason.     TESTING:  -- none    REFERRALS:  -- none    PREVENTION (use daily regardless of headache):  -- continue Ajovy once monthly  -- start magnesium in ONE of the following preparations -               1. Magnesium oxide 800mg daily (the most common over the counter kind, may causes loose stools)              2. Magnesium citrate 400-500mg daily (harder to find, but more neutral on the bowels)              3. Magnesium glycinate 400mg daily (hardest to find, look online, but most bowel-neutral, best absorbed)     AS-NEEDED TREATMENT (use total no more than 10 days per month unless otherwise stated):  -- continue butalbital as needed  -- TRIAL Nurtec once every other day the week Ajovy is due.    Follow up in about 1 year (around 3/21/2025).    Iesha Fabian NP

## 2024-11-13 DIAGNOSIS — G43.109 MIGRAINE WITH AURA AND WITHOUT STATUS MIGRAINOSUS, NOT INTRACTABLE: ICD-10-CM

## 2024-11-13 DIAGNOSIS — Z12.31 VISIT FOR SCREENING MAMMOGRAM: Primary | ICD-10-CM

## 2024-11-13 RX ORDER — BUTALBITAL, ACETAMINOPHEN AND CAFFEINE 300; 40; 50 MG/1; MG/1; MG/1
1 CAPSULE ORAL EVERY 8 HOURS PRN
Qty: 30 CAPSULE | Refills: 0 | Status: SHIPPED | OUTPATIENT
Start: 2024-11-13

## 2025-01-22 DIAGNOSIS — G43.119 INTRACTABLE MIGRAINE WITH AURA WITHOUT STATUS MIGRAINOSUS: ICD-10-CM

## 2025-01-23 ENCOUNTER — PATIENT MESSAGE (OUTPATIENT)
Dept: NEUROLOGY | Facility: CLINIC | Age: 55
End: 2025-01-23
Payer: OTHER GOVERNMENT

## 2025-01-23 RX ORDER — FREMANEZUMAB-VFRM 225 MG/1.5ML
INJECTION SUBCUTANEOUS
Qty: 4.5 ML | Refills: 0 | Status: SHIPPED | OUTPATIENT
Start: 2025-01-23

## 2025-03-25 ENCOUNTER — OFFICE VISIT (OUTPATIENT)
Dept: NEUROLOGY | Facility: CLINIC | Age: 55
End: 2025-03-25
Payer: OTHER GOVERNMENT

## 2025-03-25 ENCOUNTER — TELEPHONE (OUTPATIENT)
Dept: NEUROLOGY | Facility: CLINIC | Age: 55
End: 2025-03-25

## 2025-03-25 VITALS
DIASTOLIC BLOOD PRESSURE: 85 MMHG | HEIGHT: 67 IN | HEART RATE: 73 BPM | WEIGHT: 256.19 LBS | SYSTOLIC BLOOD PRESSURE: 132 MMHG | RESPIRATION RATE: 17 BRPM | BODY MASS INDEX: 40.21 KG/M2 | TEMPERATURE: 98 F

## 2025-03-25 DIAGNOSIS — G43.119 INTRACTABLE MIGRAINE WITH AURA WITHOUT STATUS MIGRAINOSUS: ICD-10-CM

## 2025-03-25 DIAGNOSIS — G43.109 MIGRAINE WITH AURA AND WITHOUT STATUS MIGRAINOSUS, NOT INTRACTABLE: ICD-10-CM

## 2025-03-25 PROCEDURE — 99213 OFFICE O/P EST LOW 20 MIN: CPT | Mod: S$PBB,,, | Performed by: NURSE PRACTITIONER

## 2025-03-25 PROCEDURE — 99214 OFFICE O/P EST MOD 30 MIN: CPT | Mod: PBBFAC,PO | Performed by: NURSE PRACTITIONER

## 2025-03-25 PROCEDURE — 99999 PR PBB SHADOW E&M-EST. PATIENT-LVL IV: CPT | Mod: PBBFAC,,, | Performed by: NURSE PRACTITIONER

## 2025-03-25 RX ORDER — FREMANEZUMAB-VFRM 225 MG/1.5ML
INJECTION SUBCUTANEOUS
Qty: 4.5 ML | Refills: 3 | Status: SHIPPED | OUTPATIENT
Start: 2025-03-25

## 2025-03-25 RX ORDER — BUTALBITAL, ACETAMINOPHEN AND CAFFEINE 300; 40; 50 MG/1; MG/1; MG/1
1 CAPSULE ORAL EVERY 8 HOURS PRN
Qty: 30 CAPSULE | Refills: 0 | Status: SHIPPED | OUTPATIENT
Start: 2025-03-25

## 2025-03-25 RX ORDER — RIMEGEPANT SULFATE 75 MG/75MG
75 TABLET, ORALLY DISINTEGRATING ORAL DAILY PRN
Qty: 8 TABLET | Refills: 11 | Status: SHIPPED | OUTPATIENT
Start: 2025-03-25

## 2025-03-25 NOTE — PATIENT INSTRUCTIONS
Please call our clinic at [phone] or send a message on the [employer] portal if there are any changes to the plan described below, for example,if you are not contacted for the requested tests, referral(s) within one week, if you are unable to receive the medications prescribed, or if you feel you need to change the treatment course for any reason.     TESTING:  -- none    REFERRALS:  -- none    PREVENTION (use daily regardless of headache):  -- continue Ajovy once monthly  -- start magnesium in ONE of the following preparations -               1. Magnesium oxide 800mg daily (the most common over the counter kind, may causes loose stools)              2. Magnesium citrate 400-500mg daily (harder to find, but more neutral on the bowels)              3. Magnesium glycinate 400mg daily (hardest to find, look online, but most bowel-neutral, best absorbed)     AS-NEEDED TREATMENT (use total no more than 10 days per month unless otherwise stated):  -- continue butalbital as needed  -- continue Nurtec once every other day the week Ajovy is due.

## 2025-03-25 NOTE — ASSESSMENT & PLAN NOTE
She has had over 50% improvement with Ajovy. Still has a wear off effect the last week before due but takes Nurtec QOD dosing and that has been effective. Continue current plan.

## 2025-03-25 NOTE — PROGRESS NOTES
Date of service: 3/25/2025  Referring provider: No ref. provider found    Subjective:      Chief complaint: Headache         Patient ID: Mely Frias is a 55 y.o. female with anxiety, edema, fatty liver, GERD, h/o DVT, HLD, depression, migraine, obesity who presents for follow up of headache     History of Present Illness    INTERVAL HISTORY 3/25/25    Last visit was one year ago and at that time he was better.     Today she reports she is better. Current pain 0 with range 0-10. Frequency varies. She takes Advil twice per week. She takes Nurtec typically the week Ajovy is due. Otherwise information below is reviewed and verified with no changes made     INTERVAL HISTORY 3/21/24    Last visit was one year ago and at that time she was doing better.    Today she reports she is better. Current pain 0 with range 0-9. She reports may once headache per week. She takes butalbital about once per week. Otherwise information below is reviewed and verified with no changes made     INTERVAL HISTORY 3/6/23    Last visit was three months ago and at that time we started Ajovy and rizatriptan.     Today she reports she is better. She is not waking up with headaches. They occur in the temples to back of head, behind eyes. Current pain 0 with range 0-10. She has one headache day per week. She takes Fioricet or rizatriptan as needed. Last Fioricet filled December 2023 per . Otherwise information below is reviewed and verified with no changes made     ORIGINAL HEADACHE HISTORY - 12/6/22  Age at onset and course over time: around puberty began with menstrual migraines. Now the weather is a major trigger.     She did get Covid earlier this year. She had an aura sensation, dizziness. She had a headache but otherwise was fine. She is having more migraines since then and now having wake up and middle of the night migraines. MRI brain without contrast done in July 2022 which showed bilateral mastoid effusions. She saw ENT at that time.      She did sleep study last night.    Aura - occurs with the severe migraines. Colors in front of face.     Last eye exam - 2 years ago    Location:  right side, left side, holocephalic   Quality:  [x] pressure [] tight [x] throbbing [] sharp [x] stabbing   Severity: current 0 with range 0-9  Duration: hours, days  Frequency: 2 days per week  Headaches awaken at night?:  yes  Worst time of day: upon waking, mid-day  Associated with: [x] photophobia []  phonophobia [x] osmophobia [] blurred vision  [] double vision [x] loss of appetite [x] nausea [x] vomiting [] dizziness [x] vertigo  [] tinnitus [] irritability [x] sinus pressure [] problems with concentration   [x] neck tightness   Alleviated by:  [x] sleep [x] darkness [x] massage [] heat [x] ice [x] medication  Exacerbated by:  [x] fatigue [x] light [] noise [x] smells [] coughing [] sneezing  [x] bending over [x] Ovulation (historically) [] menses [] alcohol [x] change in weather [x]  stress  Ipsilateral autonomic: [] nasal congestion [] lacrimation [] ptosis [] injection [] edema [] foreign body sensation [] ear fullness   ICP:  [] transient visual obscurations  [] tinnitus   [] positional headache  [x] non-positional   Sleep habits: trouble staying asleep, snoring   Caffeine intake: diet sprite  Gyn status (if female): hysterectomy   HIT 6 - 61    Current acute treatment:  Fioricet  Ibuprofen   Nurtec    Current prevention:  Ajovy - first February 2023    Previously tried/failed acute treatment:  Tylenol  Ibuprofen  Naproxen  Rizatriptan - not effective  Sumatriptan    Previously tried/failed preventative treatment:  Verapamil  Lexapro  Diltiazem  Depakote  Phenobarbital     Review of patient's allergies indicates:  No Known Allergies  Current Outpatient Medications   Medication Sig Dispense Refill    cetirizine (ZYRTEC) 10 MG tablet Take 10 mg by mouth daily as needed.       ibuprofen (ADVIL,MOTRIN) 200 MG tablet Take 200 mg by mouth every 6 (six) hours as  needed for Pain.      metFORMIN (GLUCOPHAGE) 500 MG tablet Take 500 mg by mouth daily with breakfast.      multivit with min-folic acid 200 mcg Chew Take 2 tablets by mouth once daily. One A Day Women's chewable      tirzepatide (MOUNJARO) 5 mg/0.5 mL PnIj Inject 5 mg into the skin every 7 days. 4 Pen 3    butalbital-acetaminophen-caff -40 mg Cap Take 1 capsule by mouth every 8 (eight) hours as needed (migraine). 30 capsule 0    diclofenac sodium (VOLTAREN ARTHRITIS PAIN) 1 % Gel Apply 2 g topically 4 (four) times daily. (Patient not taking: Reported on 3/25/2025) 100 g 3    fremanezumab-vfrm (AJOVY AUTOINJECTOR) 225 mg/1.5 mL autoinjector INJECT 1.5 ML (225 MG TOTAL) UNDER THE SKIN EVERY 28 DAYS 4.5 mL 3    rimegepant (NURTEC) 75 mg odt Take 1 tablet (75 mg total) by mouth daily as needed for Migraine. Place ODT tablet on the tongue; alternatively the ODT tablet may be placed under the tongue 8 tablet 11    semaglutide (OZEMPIC SUBQ) Inject 50 Units into the skin once a week. (Patient not taking: Reported on 3/25/2025)       No current facility-administered medications for this visit.     Facility-Administered Medications Ordered in Other Visits   Medication Dose Route Frequency Provider Last Rate Last Admin    dextrose 50% injection 12.5 g  12.5 g Intravenous PRN Karan Sorto MD        dextrose 50% injection 25 g  25 g Intravenous PRN Karan Sorto MD        HYDROmorphone injection 0.5 mg  0.5 mg Intravenous Q5 Min PRN Pollo Gabriel MD   0.5 mg at 09/14/23 0755    insulin regular injection 0-8 Units  0-8 Units Intravenous PRN Karan Sorto MD        lactated ringers infusion   Intravenous Continuous Karan Sorto MD 20 mL/hr at 09/14/23 0614 Restarted at 09/14/23 0647    ondansetron injection 4 mg  4 mg Intravenous Daily PRN Pollo Gabriel MD        prochlorperazine injection Soln 10 mg  10 mg Intravenous Q30 Min PRN Pollo Gabriel MD           Past Medical  History  Past Medical History:   Diagnosis Date    Allergy     Arthritis     Breast disorder     Deep vein thrombosis     DVT in left leg after hysterectomy    DVT (deep venous thrombosis)     left leg    Migrane     Renal disorder     URETERAL STONE       Past Surgical History  Past Surgical History:   Procedure Laterality Date    ADENOIDECTOMY      COLONOSCOPY N/A 10/04/2019    Procedure: COLONOSCOPY;  Surgeon: Cain Johnson MD;  Location: [facility];  Service: Endoscopy;  Laterality: N/A;    CYSTOSCOPY WITH CALCULUS EXTRACTION Left 2023    Procedure: CYSTOSCOPY, WITH CALCULUS REMOVAL;  Surgeon: Roc Godinez MD;  Location: [facility] OR;  Service: Urology;  Laterality: Left;    CYSTOURETEROSCOPY WITH RETROGRADE PYELOGRAPHY AND INSERTION OF STENT INTO URETER Left 2023    Procedure: CYSTOURETEROSCOPY,;  Surgeon: Roc Godinez MD;  Location: [facility] OR;  Service: Urology;  Laterality: Left;    DIAGNOSTIC LAPAROSCOPY      ovarian cyst removal    HYSTERECTOMY      complete    LASER LITHOTRIPSY Left 2023    Procedure: LITHOTRIPSY, USING LASER - Thulium;  Surgeon: Roc Godinez MD;  Location: [facility] OR;  Service: Urology;  Laterality: Left;    steriod injection Right 2025    tennis elbow    SURGICAL REMOVAL OF LESION OF CHEST WALL Left 2025    SURGICAL REMOVAL OF LESION OF FACE  2025       Family History  Family History   Problem Relation Name Age of Onset    Breast cancer Mother          50's    Lung cancer Mother      Pancreatic cancer Mother      Hypertension Father      Ovarian cancer Maternal Grandmother      Cancer Maternal Grandmother      Cancer Paternal Grandmother      Heart disease Paternal Grandfather      Diabetes Paternal Grandfather      Colon cancer Neg Hx      Eclampsia Neg Hx      Miscarriages / Stillbirths Neg Hx       labor Neg Hx      Stroke Neg Hx         Social History  Social History     Socioeconomic History    Marital status:    Tobacco Use     Smoking status: Never    Smokeless tobacco: Never   Substance and Sexual Activity    Alcohol use: Yes     Comment: Occasional    Drug use: No    Sexual activity: Yes     Partners: Male     Birth control/protection: See Surgical Hx     Social Drivers of Health     Financial Resource Strain: Low Risk  (3/25/2025)    Overall Financial Resource Strain (CARDIA)     Difficulty of Paying Living Expenses: Not hard at all   Food Insecurity: No Food Insecurity (3/25/2025)    Hunger Vital Sign     Worried About Running Out of Food in the Last Year: Never true     Ran Out of Food in the Last Year: Never true   Transportation Needs: No Transportation Needs (3/25/2025)    PRAPARE - Transportation     Lack of Transportation (Medical): No     Lack of Transportation (Non-Medical): No   Physical Activity: Sufficiently Active (3/25/2025)    Exercise Vital Sign     Days of Exercise per Week: 4 days     Minutes of Exercise per Session: 60 min   Recent Concern: Physical Activity - Insufficiently Active (1/10/2025)    Exercise Vital Sign     Days of Exercise per Week: 4 days     Minutes of Exercise per Session: 30 min   Stress: No Stress Concern Present (3/25/2025)    [employer] [employer] - Occupational Stress Questionnaire     Feeling of Stress : Not at all   Housing Stability: Low Risk  (3/25/2025)    Housing Stability Vital Sign     Unable to Pay for Housing in the Last Year: No     Homeless in the Last Year: No          Objective:        Vitals:    03/25/25 1519   BP: 132/85   Pulse: 73   Resp: 17   Temp: 98 °F (36.7 °C)           Body mass index is 40.12 kg/m².    3/25/25  Constitutional:   She appears well-developed and well-nourished. She is well groomed     Neurological Exam:  General: well-developed, well-nourished, no distress  Mental status: Awake and alert  Speech language: No dysarthria or aphasia on conversation  Cranial nerves: Face symmetric  Motor: Moves all extremities well  Coordination: No  ataxia. No tremor.       Data Review:     I have personally reviewed the referring provider's notes, labs, & imaging made available to me today.      RADIOLOGY STUDIES:  I have personally reviewed the pertinent images performed.       Results for orders placed or performed in visit on 07/01/22   MRI Brain Without Contrast    Narrative    EXAMINATION:  MRI BRAIN WITHOUT CONTRAST    CLINICAL HISTORY:  Headache, new or worsening (Age >= 50y); new daily persistent headache (NDPH).  Migraines.    TECHNIQUE:  Multiplanar multisequence MR imaging of the brain was performed without contrast.    COMPARISON:  None.    FINDINGS:  INTRACRANIAL: Normal parenchymal volume.  Few tiny scattered nonspecific supratentorial white matter T2 FLAIR hyperintense foci.  No parenchymal restricted diffusion.  No evidence of intracranial hemorrhage.  No extra-axial fluid collection or mass.  No intracranial mass effect.  No hydrocephalus.  Midline structures have a normal configuration.  Visualized pituitary gland and infundibulum are normal.  Visualized major intracranial vascular structures demonstrate normal flow voids and are normal in course and caliber.    SINUSES: Paranasal sinuses are clear.  Incidental pneumatization of the clinoid processes bilaterally.  Bilateral mastoid effusions.    ORBITS: Visualized orbits are normal.      Impression    1. No acute intracranial abnormality.  2. Few scattered nonspecific white matter changes which may represent early chronic microvascular ischemic change and/or sequela of migraines.  3. Bilateral mastoid effusions.      Electronically signed by: Ananth Salazar MD  Date:    07/01/2022  Time:    09:39       Lab Results   Component Value Date     08/21/2023    K 4.2 08/21/2023     08/21/2023    CO2 26 08/21/2023    BUN 17 08/21/2023    CREATININE 0.90 08/21/2023    GLU 94 08/21/2023    HGBA1C 5.0 09/14/2023    AST 46 (H) 11/15/2022    ALT 53 (H) 11/15/2022    ALBUMIN 4.3 11/15/2022     PROT 7.7 11/15/2022    BILITOT 0.7 11/15/2022    CHOL 266 (H) 11/15/2022    HDL 37 (L) 11/15/2022    LDLCALC 191.6 (H) 11/15/2022    TRIG 187 (H) 11/15/2022       Lab Results   Component Value Date    WBC 7.02 08/21/2023    HGB 13.4 08/21/2023    HCT 37.1 08/21/2023    MCV 83 08/21/2023     08/21/2023       Lab Results   Component Value Date    TSH 1.280 11/15/2022           Assessment & Plan:       Problem List Items Addressed This Visit          Neuro    Intractable migraine with aura without status migrainosus    Overview   Migraine headaches since puberty that were worsened with covid infection. Headaches are typically unilateral, moderate to severe in intensity, worsen with activity, pounding in quality and associated with sensitivity to light and smell.   Gradual progression pattern, lack of red flag features on history, and normal neurological exam are reassuring for primary as opposed to secondary etiology of headaches thus imaging will not be pursued for this history and this exam at this time.    She has done very well on Ajovy. Continue.     For acute attacks trial Nurtec.     Add magnesium for aura prevention          Current Assessment & Plan   She has had over 50% improvement with Ajovy. Still has a wear off effect the last week before due but takes Nurtec QOD dosing and that has been effective. Continue current plan.          Relevant Medications    rimegepant (NURTEC) 75 mg odt    butalbital-acetaminophen-caff -40 mg Cap    fremanezumab-vfrm (AJOVY AUTOINJECTOR) 225 mg/1.5 mL autoinjector     Other Visit Diagnoses         Migraine with aura and without status migrainosus, not intractable        Relevant Medications    butalbital-acetaminophen-caff -40 mg Cap            \        Please call our clinic at [phone] or send a message on the [employer] portal if there are any changes to the plan described below, for example,if you are not contacted for the requested tests, referral(s)  within one week, if you are unable to receive the medications prescribed, or if you feel you need to change the treatment course for any reason.     TESTING:  -- none    REFERRALS:  -- none    PREVENTION (use daily regardless of headache):  -- continue Ajovy once monthly  -- start magnesium in ONE of the following preparations -               1. Magnesium oxide 800mg daily (the most common over the counter kind, may causes loose stools)              2. Magnesium citrate 400-500mg daily (harder to find, but more neutral on the bowels)              3. Magnesium glycinate 400mg daily (hardest to find, look online, but most bowel-neutral, best absorbed)     AS-NEEDED TREATMENT (use total no more than 10 days per month unless otherwise stated):  -- continue butalbital as needed  -- continue Nurtec once every other day the week Ajovy is due.    Follow up in about 1 year (around 3/25/2026).    Iesha Fabian NP

## 2025-03-26 ENCOUNTER — TELEPHONE (OUTPATIENT)
Dept: NEUROLOGY | Facility: CLINIC | Age: 55
End: 2025-03-26
Payer: OTHER GOVERNMENT

## 2025-07-21 RX ORDER — BUTALBITAL, ACETAMINOPHEN AND CAFFEINE 300; 40; 50 MG/1; MG/1; MG/1
CAPSULE ORAL
Qty: 30 CAPSULE | Refills: 0 | Status: SHIPPED | OUTPATIENT
Start: 2025-07-21

## 2025-07-21 NOTE — TELEPHONE ENCOUNTER
Left message on voicemail of [facility] per Iesha Fabian NP's orders for Fioricet. As an error occurred while processing the e-prescribing message.

## 2025-08-14 DIAGNOSIS — G43.119 INTRACTABLE MIGRAINE WITH AURA WITHOUT STATUS MIGRAINOSUS: ICD-10-CM

## 2025-08-15 RX ORDER — RIMEGEPANT SULFATE 75 MG/75MG
75 TABLET, ORALLY DISINTEGRATING ORAL DAILY PRN
Qty: 8 TABLET | Refills: 11 | Status: SHIPPED | OUTPATIENT
Start: 2025-08-15

## 2025-08-19 ENCOUNTER — TELEPHONE (OUTPATIENT)
Dept: NEUROLOGY | Facility: CLINIC | Age: 55
End: 2025-08-19
Payer: OTHER GOVERNMENT